# Patient Record
Sex: MALE | Race: WHITE | NOT HISPANIC OR LATINO | Employment: FULL TIME | URBAN - METROPOLITAN AREA
[De-identification: names, ages, dates, MRNs, and addresses within clinical notes are randomized per-mention and may not be internally consistent; named-entity substitution may affect disease eponyms.]

---

## 2021-07-05 ENCOUNTER — HOSPITAL ENCOUNTER (EMERGENCY)
Facility: CLINIC | Age: 26
Discharge: HOME | End: 2021-07-05
Attending: NURSE PRACTITIONER | Admitting: NURSE PRACTITIONER
Payer: OTHER GOVERNMENT

## 2021-07-05 VITALS
HEART RATE: 96 BPM | OXYGEN SATURATION: 99 % | DIASTOLIC BLOOD PRESSURE: 70 MMHG | TEMPERATURE: 98 F | RESPIRATION RATE: 16 BRPM | SYSTOLIC BLOOD PRESSURE: 135 MMHG

## 2021-07-05 DIAGNOSIS — R11.2 NAUSEA WITH VOMITING: ICD-10-CM

## 2021-07-05 DIAGNOSIS — F17.200 NICOTINE DEPENDENCE: ICD-10-CM

## 2021-07-05 DIAGNOSIS — R07.9 CHEST PAIN: ICD-10-CM

## 2021-07-05 LAB
ALBUMIN SERPL-MCNC: 4.5 G/DL (ref 3.4–5)
ALP SERPL-CCNC: 87 U/L (ref 40–150)
ALT SERPL W P-5'-P-CCNC: 32 U/L (ref 0–70)
ANION GAP SERPL CALCULATED.3IONS-SCNC: 7 MMOL/L (ref 3–14)
AST SERPL W P-5'-P-CCNC: 37 U/L (ref 0–45)
BILIRUB SERPL-MCNC: 0.8 MG/DL (ref 0.2–1.3)
BUN SERPL-MCNC: 9 MG/DL (ref 7–30)
CALCIUM SERPL-MCNC: 9.8 MG/DL (ref 8.5–10.1)
CHLORIDE SERPL-SCNC: 100 MMOL/L (ref 94–109)
CO2 SERPL-SCNC: 28 MMOL/L (ref 20–32)
CREAT SERPL-MCNC: 0.85 MG/DL (ref 0.66–1.25)
ERYTHROCYTE [DISTWIDTH] IN BLOOD BY AUTOMATED COUNT: 12.5 % (ref 10–15)
GFR SERPL CREATININE-BSD FRML MDRD: >90 ML/MIN/{1.73_M2}
GLUCOSE SERPL-MCNC: 78 MG/DL (ref 70–99)
HCT VFR BLD AUTO: 46.6 % (ref 40–53)
HGB BLD-MCNC: 16.1 G/DL (ref 13.3–17.7)
LIPASE SERPL-CCNC: 49 U/L (ref 73–393)
MCH RBC QN AUTO: 32.6 PG (ref 26.5–33)
MCHC RBC AUTO-ENTMCNC: 34.5 G/DL (ref 31.5–36.5)
MCV RBC AUTO: 94 FL (ref 78–100)
PLATELET # BLD AUTO: 213 10E9/L (ref 150–450)
POTASSIUM SERPL-SCNC: 4.1 MMOL/L (ref 3.4–5.3)
PROT SERPL-MCNC: 8.4 G/DL (ref 6.8–8.8)
RBC # BLD AUTO: 4.94 10E12/L (ref 4.4–5.9)
SODIUM SERPL-SCNC: 135 MMOL/L (ref 133–144)
TROPONIN I SERPL-MCNC: <0.015 UG/L (ref 0–0.04)
WBC # BLD AUTO: 12.9 10E9/L (ref 4–11)

## 2021-07-05 PROCEDURE — 85027 COMPLETE CBC AUTOMATED: CPT | Performed by: NURSE PRACTITIONER

## 2021-07-05 PROCEDURE — 84484 ASSAY OF TROPONIN QUANT: CPT | Performed by: NURSE PRACTITIONER

## 2021-07-05 PROCEDURE — 99284 EMERGENCY DEPT VISIT MOD MDM: CPT | Mod: 25

## 2021-07-05 PROCEDURE — 83690 ASSAY OF LIPASE: CPT | Performed by: NURSE PRACTITIONER

## 2021-07-05 PROCEDURE — 96374 THER/PROPH/DIAG INJ IV PUSH: CPT

## 2021-07-05 PROCEDURE — 80053 COMPREHEN METABOLIC PANEL: CPT | Performed by: NURSE PRACTITIONER

## 2021-07-05 PROCEDURE — 250N000009 HC RX 250: Performed by: NURSE PRACTITIONER

## 2021-07-05 PROCEDURE — 93005 ELECTROCARDIOGRAM TRACING: CPT

## 2021-07-05 PROCEDURE — 250N000013 HC RX MED GY IP 250 OP 250 PS 637: Performed by: NURSE PRACTITIONER

## 2021-07-05 RX ORDER — ONDANSETRON 2 MG/ML
4 INJECTION INTRAMUSCULAR; INTRAVENOUS ONCE
Status: DISCONTINUED | OUTPATIENT
Start: 2021-07-05 | End: 2021-07-05 | Stop reason: HOSPADM

## 2021-07-05 RX ADMIN — LIDOCAINE HYDROCHLORIDE 30 ML: 20 SOLUTION ORAL; TOPICAL at 19:29

## 2021-07-05 ASSESSMENT — ENCOUNTER SYMPTOMS
ABDOMINAL PAIN: 0
FEVER: 0
DYSURIA: 0
NAUSEA: 1
SHORTNESS OF BREATH: 0
CHILLS: 0
VOMITING: 1

## 2021-07-05 NOTE — ED TRIAGE NOTES
Pt started having midsternal chest pain and nausea comes on with it. Pt states had emesis right after and felt better. Pains come on and off today. Burping a lot. ABCs intact. A&Ox4.

## 2021-07-06 LAB — INTERPRETATION ECG - MUSE: NORMAL

## 2021-07-06 NOTE — ED PROVIDER NOTES
"  History   Chief Complaint:  Chest Pain     The history is provided by the patient.      Ollie Griffin is a 26 year old male with history of nicotine dependence and alcohol use who presents with chest pain. The patient woke up this morning and felt totally fine, Shortly after this he became nauseous and then vomited. After vomiting he developed mid-line chest pain that felt like a burning sensation. This pain went away after ten minutes. Shortly after, he vomited 2 more times. He did eat some strawberries and the pain returned. He did report drinking \"a lot\" of alcohol last night.     Review of Systems   Constitutional: Negative for chills and fever.   Respiratory: Negative for shortness of breath.    Cardiovascular: Positive for chest pain.   Gastrointestinal: Positive for nausea and vomiting. Negative for abdominal pain.   Genitourinary: Negative for dysuria.   All other systems reviewed and are negative.    Allergies:  No Known Allergies    Medications:  The patient did not report the use of any daily medications    Past Medical History:    Nicotine dependence  Viral syndrome  Joint pain localized in the knee  Abrasion or friction burn    Social History:  Presents with wife  Alcohol user  Chews tobacco    Physical Exam     Patient Vitals for the past 24 hrs:   BP Temp Temp src Pulse Resp SpO2   07/05/21 2034 135/70 -- -- 96 16 99 %   07/05/21 2030 135/70 -- -- 96 -- 96 %   07/05/21 2015 (!) 141/90 -- -- 107 -- 96 %   07/05/21 2000 (!) 144/104 -- -- 117 -- 98 %   07/05/21 1945 136/89 -- -- 97 -- 94 %   07/05/21 1930 (!) 134/98 -- -- -- -- --   07/05/21 1748 (!) 158/99 -- -- -- -- --   07/05/21 1747 -- 98  F (36.7  C) Temporal 114 20 100 %       Physical Exam  General: Alert, No obvious discomfort, well kept  Eyes: PERRL, conjunctivae pink no scleral icterus or conjunctival injection  ENT:   Moist mucus membranes, posterior oropharynx clear without erythema or exudates, No lymphadenopathy, Normal voice  Resp:  " "Lungs clear to auscultation bilaterally, no crackles/rubs/wheezes. Good air movement  CV:  Normal rate and rhythm, no murmurs/rubs/gallops  GI:  Abdomen soft and non-distended.  Normoactive BS.  No tenderness, guarding or rebound, No masses  Skin:  Warm, dry.  No rashes or petechiae  Musculoskeletal: No peripheral edema or calf tenderness, Normal gross ROM   Neuro: Alert and oriented to person/place/time, normal sensation  Psychiatric: Normal affect, cooperative, good eye contact    Emergency Department Course   ECG  ECG taken at 1816, ECG read at 1902  Normal sinus rhythm. Normal ECG.    Rate 96 bpm. FL interval 122 ms. QRS duration 98 ms. QT/QTc 370/467 ms. P-R-T axes 65 57 19.     Laboratory:  (1928) Troponin: <0.015  CMP: WNL (Creatinine: 0.85)  Lipase: 49 (L)  CBC: WBC 12.9 (H), HGB 16.1,      Emergency Department Course:    Reviewed:  I reviewed nursing notes, vitals, past medical history and care everywhere    Assessments:  1859 I obtained history and examined the patient as noted above.   2019 I rechecked the patient and explained findings.     Interventions:  1929 GI Cocktail 30 mL oral    Disposition:  The patient was discharged to home.     Impression & Plan     Medical Decision Making:  Ollie Griffin is a 26 year old male who presents today for evaluation of nausea and vomiting as well as epigastric/chest discomfort.  He reports celebrating 4 July and drinking a lot of alcohol last evening.  Upon awakening this morning he developed vomiting and shortly after that chest/epigastric discomfort.  As this occurred 3 more times he presented for evaluation.  His examination here was consistent with epigastric/esophageal source of his discomfort.  He does not have any known variceal was for history of ulcers.  His mother does report that he drinks \"excessively.\"  His laboratory studies are noncontributory.  He has a nonacute EKG and negative troponin.  He had good relief of his symptoms with GI " cocktail as above.  I suspect that this is alcohol related gastritis and possibly some mild esophagitis from vomiting.  He is also a smokeless tobacco user.  He is advised on appropriate use of over-the-counter antacids.  There is no indication for further testing or imagery.  He does appear to be safe and appropriate for outpatient management follow-up and is discharged home.      Covid-19  Ollie Griffin was evaluated during a global COVID-19 pandemic, which necessitated consideration that the patient might be at risk for infection with the SARS-CoV-2 virus that causes COVID-19.   Applicable protocols for evaluation were followed during the patient's care. COVID-19 was considered as part of the patient's evaluation.    Diagnosis:    ICD-10-CM    1. Nausea with vomiting  R11.2    2. Chest pain  R07.9    3. Nicotine dependence  F17.200 Primary Care Referral     Scribe Disclosure:  Mildred MORGAN, am serving as a scribe at 7:00 PM on 7/5/2021 to document services personally performed by Segun Ortega APRN based on my observations and the provider's statements to me.            Sgeun Ortega APRN CNP  07/05/21 3775

## 2021-07-06 NOTE — DISCHARGE INSTRUCTIONS
Try over-the-counter Pepcid use as directed.  Avoid fatty spicy meals.  Decrease/stop your alcohol intake and tobacco use.  Again if you continue to have symptoms after a week or if they worsen I recommend you follow-up with gastroenterology.